# Patient Record
Sex: FEMALE | Race: WHITE | URBAN - METROPOLITAN AREA
[De-identification: names, ages, dates, MRNs, and addresses within clinical notes are randomized per-mention and may not be internally consistent; named-entity substitution may affect disease eponyms.]

---

## 2020-01-09 ENCOUNTER — HOSPITAL ENCOUNTER (EMERGENCY)
Age: 20
Discharge: HOME | End: 2020-01-09
Attending: EMERGENCY MEDICINE

## 2020-01-09 VITALS
TEMPERATURE: 97.9 F | OXYGEN SATURATION: 99 % | RESPIRATION RATE: 18 BRPM | DIASTOLIC BLOOD PRESSURE: 50 MMHG | SYSTOLIC BLOOD PRESSURE: 108 MMHG | HEART RATE: 91 BPM

## 2020-01-09 PROCEDURE — 4500000002 HC ER NO CHARGE

## 2020-01-09 ASSESSMENT — PAIN DESCRIPTION - PAIN TYPE: TYPE: ACUTE PAIN

## 2020-01-09 ASSESSMENT — PAIN SCALES - GENERAL: PAINLEVEL_OUTOF10: 7

## 2020-01-09 ASSESSMENT — PAIN DESCRIPTION - LOCATION: LOCATION: HEAD

## 2020-01-09 NOTE — ED PROVIDER NOTES
77-year-old female brought by EMS after reportedly passing out at a party. She admitted drinking alcohol and taking Xanax. They gave her Narcan 4 mg intranasally and she woke up. They brought her here for evaluation. The patient was ambulatory into the ED. She was afebrile with normal vital signs. She was taken into her room and briefly assessed by the nurse. 2 or 3 minutes later the patient walked out of the room and was seen walking out of the ER before she was seen by the physician. She could not be located in the ER or outside.     DX : Drug overdose      Howard Guzman MD  01/09/20 [id number]

## 2020-01-10 NOTE — ED NOTES
Went into patient's room to collect urine. Patient was not In room. Patient was not found in the ED. Registration states he saw her walking out the front door.        Ken Contreras RN  01/09/20 [address], RN  01/10/20 [id number]